# Patient Record
Sex: MALE | Race: WHITE | NOT HISPANIC OR LATINO
[De-identification: names, ages, dates, MRNs, and addresses within clinical notes are randomized per-mention and may not be internally consistent; named-entity substitution may affect disease eponyms.]

---

## 2022-03-24 PROBLEM — Z00.00 ENCOUNTER FOR PREVENTIVE HEALTH EXAMINATION: Status: ACTIVE | Noted: 2022-03-24

## 2022-05-16 ENCOUNTER — NON-APPOINTMENT (OUTPATIENT)
Age: 22
End: 2022-05-16

## 2022-05-17 ENCOUNTER — APPOINTMENT (OUTPATIENT)
Dept: GASTROENTEROLOGY | Facility: CLINIC | Age: 22
End: 2022-05-17
Payer: COMMERCIAL

## 2022-05-17 VITALS
RESPIRATION RATE: 16 BRPM | DIASTOLIC BLOOD PRESSURE: 70 MMHG | SYSTOLIC BLOOD PRESSURE: 121 MMHG | HEART RATE: 101 BPM | BODY MASS INDEX: 26.46 KG/M2 | WEIGHT: 189 LBS | OXYGEN SATURATION: 96 % | HEIGHT: 71 IN | TEMPERATURE: 98.6 F

## 2022-05-17 PROCEDURE — 99072 ADDL SUPL MATRL&STAF TM PHE: CPT

## 2022-05-17 PROCEDURE — 99204 OFFICE O/P NEW MOD 45 MIN: CPT

## 2022-05-17 NOTE — PHYSICAL EXAM
[General Appearance - Alert] : alert [General Appearance - In No Acute Distress] : in no acute distress [Sclera] : the sclera and conjunctiva were normal [PERRL With Normal Accommodation] : pupils were equal in size, round, and reactive to light [Extraocular Movements] : extraocular movements were intact [Outer Ear] : the ears and nose were normal in appearance [Oropharynx] : the oropharynx was normal [Neck Appearance] : the appearance of the neck was normal [Neck Cervical Mass (___cm)] : no neck mass was observed [Jugular Venous Distention Increased] : there was no jugular-venous distention [Thyroid Diffuse Enlargement] : the thyroid was not enlarged [Thyroid Nodule] : there were no palpable thyroid nodules [Exaggerated Use Of Accessory Muscles For Inspiration] : no accessory muscle use [Full Pulse] : the pedal pulses are present [Edema] : there was no peripheral edema [Bowel Sounds] : normal bowel sounds [Abdomen Soft] : soft [Abdomen Tenderness] : non-tender [Abdomen Mass (___ Cm)] : no abdominal mass palpated [Cervical Lymph Nodes Enlarged Posterior Bilaterally] : posterior cervical [Cervical Lymph Nodes Enlarged Anterior Bilaterally] : anterior cervical [Supraclavicular Lymph Nodes Enlarged Bilaterally] : supraclavicular [Axillary Lymph Nodes Enlarged Bilaterally] : axillary [Femoral Lymph Nodes Enlarged Bilaterally] : femoral [Inguinal Lymph Nodes Enlarged Bilaterally] : inguinal [No CVA Tenderness] : no ~M costovertebral angle tenderness [No Spinal Tenderness] : no spinal tenderness [Abnormal Walk] : normal gait [Nail Clubbing] : no clubbing  or cyanosis of the fingernails [Musculoskeletal - Swelling] : no joint swelling seen [Motor Tone] : muscle strength and tone were normal [Skin Color & Pigmentation] : normal skin color and pigmentation [Skin Turgor] : normal skin turgor [] : no rash [Deep Tendon Reflexes (DTR)] : deep tendon reflexes were 2+ and symmetric [No Focal Deficits] : no focal deficits [Sensation] : the sensory exam was normal to light touch and pinprick [Oriented To Time, Place, And Person] : oriented to person, place, and time [Impaired Insight] : insight and judgment were intact [Affect] : the affect was normal

## 2022-05-18 NOTE — REVIEW OF SYSTEMS
[Negative] : Heme/Lymph [As noted in HPI] : as noted in HPI [Heartburn] : heartburn [As Noted in HPI] : as noted in HPI [Abdominal Pain] : no abdominal pain [Vomiting] : no vomiting [Constipation] : no constipation [Diarrhea] : no diarrhea [Melena] : no melena

## 2022-05-18 NOTE — ASSESSMENT
[FreeTextEntry1] : 21M PMHx GERD presenting to GI clinic to establish care as well as for management for reflux. \par \par #GERD\par Reports long-standing history of acid reflux, denies any associated red flag sxs including odynophagia, dysphagia, hoarseness, weight loss. Currently on Pantoprazole 20 mg qAM and Famotidine 20 mg qHS, reporting breakthrough symptoms 1-2x/week.\par - Recommended further optimization of acid suppression by increasing PPI from 20 mg to 40 mg daily\par - Advised to hold Famotidine in the interim\par - Will review official EGD report from 2020 to see if there's any other explanation for ongoing reflux sxs including a large hiatal hernia\par - If no response to PPI tx, will plan for repeat EGD in approx 8 weeks with BRAVO study \par - Counseled on lifestyle/dietary modifications\par \par RTC in 8 weeks for follow up of above sxs\par \par Katharina Wu DO\par Gastroenterology Fellow\par

## 2022-05-18 NOTE — HISTORY OF PRESENT ILLNESS
[de-identified] : HPI- 21M PMHx GERD presenting to GI clinic to establish care as well as for management for reflux. \par \par Patient reports a long standing history of GERD and had previously been following with a gastroenterologist in [place] (where he went to school). Notes being placed on PPI therapy and H2R antagonist for at least 2 yrs. Currently on Pantoprazole 20 mg AM and Famotidine 20 mg qHS. Notes previously being on PPI 40 mg however was downtitrated on his dosing given improvement in his sxs and concern for long-term use of high dose PPI tx. Reports that his symptoms are mostly triggered with heavy, known-reflux triggering foods and tries to avoid them the best he can. Currently reports having symptoms 1-2x/week, stable at this time, previously daily. \par \par EGD performed in 3/2020, notable for inflammation of the esophagus. Patient notes submitting his reports, awaiting uploading of official report. \par \par ROS + belching\par ROS negative for dysphagia, odynophagia, weight loss/weight gain, hoarseness\par \par PMHx - GERD\par PSHx - wisdom teeth removals\par Rx - Claritin \par Supplements/herbs/OTC - none\par A/C or NSAIDs? - rarely\par FMHx - no CRC or history of polyps\par Allergies - NKDA, seasonal allergies\par EtOH - 4-5x/week\par Smoking - Never\par Drugs - denies\par \par Imaging - No\par EGD - 3/2020- inflammation in esophagus\par Colonoscopy - no\par \par

## 2022-09-26 ENCOUNTER — NON-APPOINTMENT (OUTPATIENT)
Age: 22
End: 2022-09-26

## 2022-11-01 ENCOUNTER — APPOINTMENT (OUTPATIENT)
Age: 22
End: 2022-11-01

## 2022-11-01 VITALS
HEART RATE: 89 BPM | HEIGHT: 71 IN | TEMPERATURE: 98.3 F | DIASTOLIC BLOOD PRESSURE: 70 MMHG | BODY MASS INDEX: 26.04 KG/M2 | OXYGEN SATURATION: 98 % | WEIGHT: 186 LBS | SYSTOLIC BLOOD PRESSURE: 121 MMHG | RESPIRATION RATE: 16 BRPM

## 2022-11-01 PROCEDURE — 99072 ADDL SUPL MATRL&STAF TM PHE: CPT

## 2022-11-01 PROCEDURE — 99215 OFFICE O/P EST HI 40 MIN: CPT

## 2022-11-01 NOTE — PHYSICAL EXAM
[General Appearance - Alert] : alert [General Appearance - In No Acute Distress] : in no acute distress [Sclera] : the sclera and conjunctiva were normal [PERRL With Normal Accommodation] : pupils were equal in size, round, and reactive to light [Extraocular Movements] : extraocular movements were intact [Outer Ear] : the ears and nose were normal in appearance [Oropharynx] : the oropharynx was normal [Neck Appearance] : the appearance of the neck was normal [Neck Cervical Mass (___cm)] : no neck mass was observed [Jugular Venous Distention Increased] : there was no jugular-venous distention [Thyroid Diffuse Enlargement] : the thyroid was not enlarged [Thyroid Nodule] : there were no palpable thyroid nodules [Exaggerated Use Of Accessory Muscles For Inspiration] : no accessory muscle use [Full Pulse] : the pedal pulses are present [Edema] : there was no peripheral edema [Bowel Sounds] : normal bowel sounds [Abdomen Soft] : soft [Abdomen Tenderness] : non-tender [Abdomen Mass (___ Cm)] : no abdominal mass palpated [Cervical Lymph Nodes Enlarged Posterior Bilaterally] : posterior cervical [Cervical Lymph Nodes Enlarged Anterior Bilaterally] : anterior cervical [Supraclavicular Lymph Nodes Enlarged Bilaterally] : supraclavicular [Axillary Lymph Nodes Enlarged Bilaterally] : axillary [Femoral Lymph Nodes Enlarged Bilaterally] : femoral [Inguinal Lymph Nodes Enlarged Bilaterally] : inguinal [No Spinal Tenderness] : no spinal tenderness [Nail Clubbing] : no clubbing  or cyanosis of the fingernails [Musculoskeletal - Swelling] : no joint swelling seen [Motor Tone] : muscle strength and tone were normal [Skin Color & Pigmentation] : normal skin color and pigmentation [Skin Turgor] : normal skin turgor [] : no rash [Deep Tendon Reflexes (DTR)] : deep tendon reflexes were 2+ and symmetric [Sensation] : the sensory exam was normal to light touch and pinprick [No Focal Deficits] : no focal deficits [Impaired Insight] : insight and judgment were intact [Affect] : the affect was normal [Normal] : normal bowel sounds, non-tender, no masses, soft, no no hepato-splenomegaly [No CVA Tenderness] : no CVA  tenderness [Abnormal Walk] : normal gait [Normal Color / Pigmentation] : normal skin color and pigmentation [Cranial Nerves Intact] : cranial nerves 2-12 were intact [Oriented To Time, Place, And Person] : oriented to person, place, and time

## 2022-11-02 NOTE — ASSESSMENT
[FreeTextEntry1] : 22M   PMHx GERD presenting to GI clinic to establish care as well as for management for reflux. \par \par GERD\par -Reports long-standing history of acid reflux, denies any associated red flag sxs including odynophagia, dysphagia, hoarseness, weight loss.\par - Continue Pantoprazole 40 mg qAM and Famotidine 20 mg qHS, reporting breakthrough symptoms 1-2x/week.\par - Counseled on lifestyle/dietary modifications, \par - Avoid trigger foods\par \par

## 2022-11-02 NOTE — REVIEW OF SYSTEMS
[As noted in HPI] : as noted in HPI [Heartburn] : heartburn [Negative] : Heme/Lymph [As Noted in HPI] : as noted in HPI [Abdominal Pain] : no abdominal pain [Vomiting] : no vomiting [Constipation] : no constipation [Diarrhea] : no diarrhea [Melena] : no melena

## 2022-11-02 NOTE — HISTORY OF PRESENT ILLNESS
[de-identified] : HPI- 22M PMHx GERD presenting to GI clinic to establish care as well as for management for reflux.  Notes significant heartburn and belching starting in June 2019. Pt reports he currently taking pantoprazole 40 mg AM and famotidine 20 mg qhs. Pt admits to mild symptoms including belching generally driven by food triggers. Triggers include acidic foods, tomato sauces, coffee, spicy foods.  He is here today for prescription renewal. \par \par Past Hx:\par Patient reports a long standing history of GERD and had previously been following with a gastroenterologist in [place] (where he went to school). Notes being placed on PPI therapy and H2R antagonist for at least 2 yrs. Currently on Pantoprazole 20 mg AM and Famotidine 20 mg qHS. Notes previously being on PPI 40 mg however was down titrated on his dosing given improvement in his sxs and concern for long-term use of high dose PPI tx. Reports that his symptoms are mostly triggered with heavy, known-reflux triggering foods and tries to avoid them the best he can. Lifestyle / work / travel not conducive to anti reflux diet\par \par EGD performed in 3/2020, notable for inflammation of the esophagus, mild reactive gastropathy with a small focus of intestinal metaplasia. \par \par ROS + belching\par ROS negative for dysphagia, odynophagia, weight loss/weight gain, hoarseness\par \par PMHx - GERD\par PSHx - wisdom teeth removals\par Rx - Claritin \par Supplements/herbs/OTC - none\par A/C or NSAIDs? - rarely\par FMHx - no CRC or history of polyps\par Allergies - NKDA, seasonal allergies\par EtOH - 4-5x/week\par Smoking - Never\par Drugs - denies\par \par Imaging - No\par EGD - 3/2020- inflammation in esophagus\par Colonoscopy - no\par \par

## 2023-01-23 ENCOUNTER — APPOINTMENT (OUTPATIENT)
Dept: GASTROENTEROLOGY | Facility: CLINIC | Age: 23
End: 2023-01-23
Payer: COMMERCIAL

## 2023-01-23 PROCEDURE — 99213 OFFICE O/P EST LOW 20 MIN: CPT | Mod: 95

## 2023-01-23 RX ORDER — FAMOTIDINE 20 MG/1
20 TABLET, FILM COATED ORAL
Refills: 0 | Status: ACTIVE | COMMUNITY

## 2023-01-23 NOTE — HISTORY OF PRESENT ILLNESS
[de-identified] : HPI- 22M PMHx GERD presenting to GI clinic to establish care as well as for management for reflux.  Notes significant heartburn and belching starting in June 2019.  Pt reports certain foods trigger his symptoms including; acidic foods, red sauces, hot coffee, espresso, spicy foods, red wine, beer- his symptoms worsen. Pt tries to limit his consumption of triggers. He is currently taking pantoprazole 40 mg AM (for the last 3 years) and famotidine 20 mg qhs which has improved his symptoms. He no longer has "acid attacks"- burning in the esophagus, belching.  Symptoms are currently manageable while taking anti-acid medications. Denies odynophagia, dysphagia, weight loss.  Pt requests refill for pantoprazole. \par \par \par \par \par \par \par Past Hx:\par Patient reports a long standing history of GERD and had previously been following with a gastroenterologist in [place] (where he went to school). Notes being placed on PPI therapy and H2R antagonist for at least 2 yrs. Currently on Pantoprazole 20 mg AM and Famotidine 20 mg qHS. Notes previously being on PPI 40 mg however was down titrated on his dosing given improvement in his sxs and concern for long-term use of high dose PPI tx. Reports that his symptoms are mostly triggered with heavy, known-reflux triggering foods and tries to avoid them the best he can. Lifestyle / work / travel not conducive to anti reflux diet\par \par EGD performed in 3/2020, notable for inflammation of the esophagus, mild reactive gastropathy with a small focus of intestinal metaplasia. \par \par \par

## 2023-01-23 NOTE — ASSESSMENT
[FreeTextEntry1] : 22M  PMHx GERD presenting to GI clinic to discuss management for reflux. \par \par GERD\par -Reports long-standing history of acid reflux, denies any associated red flag sxs including odynophagia, dysphagia, hoarseness, weight loss.\par - Continue Pantoprazole 40 mg qAM and Famotidine 20 mg qHS, reporting breakthrough symptoms 1-2x/week, will reassess in 3 months the possibility of decreasing PPI to 20 mg daily\par - Counseled on lifestyle/dietary modifications\par - Will send over recent outside blood work to assess for RANGEL, vitamin deficiency and mag levels. If necessary will order in house labs\par - Given PCP referral\par - Avoid trigger foods\par - Limited stress in life\par \par

## 2023-01-23 NOTE — REASON FOR VISIT
[Home] : at home, [unfilled] , at the time of the visit. [Medical Office: ([facility])___] : at the medical office located in  [Patient] : the patient [Follow-up] : a follow-up of an existing diagnosis [FreeTextEntry1] : GERD

## 2023-04-19 ENCOUNTER — APPOINTMENT (OUTPATIENT)
Dept: GASTROENTEROLOGY | Facility: CLINIC | Age: 23
End: 2023-04-19
Payer: COMMERCIAL

## 2023-04-19 DIAGNOSIS — K21.9 GASTRO-ESOPHAGEAL REFLUX DISEASE W/OUT ESOPHAGITIS: ICD-10-CM

## 2023-04-19 PROCEDURE — 99213 OFFICE O/P EST LOW 20 MIN: CPT | Mod: 95

## 2023-04-20 RX ORDER — PANTOPRAZOLE 40 MG/1
40 TABLET, DELAYED RELEASE ORAL DAILY
Qty: 1 | Refills: 0 | Status: COMPLETED | COMMUNITY
Start: 2022-11-01 | End: 2023-04-20

## 2023-04-20 RX ORDER — PANTOPRAZOLE 40 MG/1
40 TABLET, DELAYED RELEASE ORAL
Qty: 30 | Refills: 0 | Status: COMPLETED | COMMUNITY
Start: 2022-09-26 | End: 2023-04-20

## 2023-04-20 NOTE — REASON FOR VISIT
[Follow-up] : a follow-up of an existing diagnosis [Home] : at home, [unfilled] , at the time of the visit. [Medical Office: ([facility])___] : at the medical office located in  [Patient] : the patient [FreeTextEntry1] : GERD

## 2023-04-20 NOTE — HISTORY OF PRESENT ILLNESS
[de-identified] : 22M PMHx GERD presenting to GI clinic to establish care as well as for management for reflux, here for follow up. \par \par 4/19/23\par - changed diet, small bowl of cereal first thing in the morning helps symptoms \par - not eating late \par - overall feeling well\par - still some belching but other symptoms better\par - currently taking 40 mg pantoprazole and 20 mg Pepcid at night \par \par previous history: \par Notes significant heartburn and belching starting in June 2019.  Pt reports certain foods trigger his symptoms including; acidic foods, red sauces, hot coffee, espresso, spicy foods, red wine, beer- his symptoms worsen. Pt tries to limit his consumption of triggers. He is currently taking pantoprazole 40 mg AM (for the last 3 years) and famotidine 20 mg qhs which has improved his symptoms. He no longer has "acid attacks"- burning in the esophagus, belching.  Symptoms are currently manageable while taking anti-acid medications. Denies odynophagia, dysphagia, weight loss.  Pt requests refill for pantoprazole. \par \par Past Hx:\par Patient reports a long standing history of GERD and had previously been following with a gastroenterologist in [place] (where he went to school). Notes being placed on PPI therapy and H2R antagonist for at least 2 yrs. Currently on Pantoprazole 20 mg AM and Famotidine 20 mg qHS. Notes previously being on PPI 40 mg however was down titrated on his dosing given improvement in his sxs and concern for long-term use of high dose PPI tx. Reports that his symptoms are mostly triggered with heavy, known-reflux triggering foods and tries to avoid them the best he can. Lifestyle / work / travel not conducive to anti reflux diet\par \par EGD performed in 3/2020, notable for inflammation of the esophagus, mild reactive gastropathy with a small focus of intestinal metaplasia.

## 2023-06-05 ENCOUNTER — RX RENEWAL (OUTPATIENT)
Age: 23
End: 2023-06-05

## 2023-07-24 ENCOUNTER — RX RENEWAL (OUTPATIENT)
Age: 23
End: 2023-07-24

## 2024-02-28 ENCOUNTER — RX RENEWAL (OUTPATIENT)
Age: 24
End: 2024-02-28

## 2024-02-28 RX ORDER — PANTOPRAZOLE 20 MG/1
20 TABLET, DELAYED RELEASE ORAL
Qty: 90 | Refills: 3 | Status: ACTIVE | COMMUNITY
Start: 2023-04-19 | End: 1900-01-01